# Patient Record
(demographics unavailable — no encounter records)

---

## 2025-05-22 NOTE — PROCEDURE
[FreeTextEntry3] : The patient was offered a steroid injection to suppress acute inflammation.  The indication for the injection is persistent pain. The risks benefits, and alternatives have been discussed, and verbal consent was obtained.   The risks, benefits and contents of the injection have been discussed.  Risks include but are not limited to allergic reaction, flare reaction, permanent white skin discoloration at the injection site and infection.  The patient understands the risks and agrees to having the injection.  All questions have been answered.   An injection of the right 1st MTPJ was performed. The indication for this procedure was pain and inflammation. The site was prepped with alcohol and sterile technique used. With a 25 gauge needle, an injection of 1cc of Lidocaine 1% , 1cc of Ropivacaine  0.5% ,and 1cc of 80mg Methylprednisolone (Depomedrol) was performed. Patient tolerated procedure well. Patient was advised to call if redness, pain, or fever occur, apply ice for 15 minutes out of every hour for the next 12-24 hours as tolerated and patient was advised to rest the joint(s) for 3 days.   Ultrasound guidance was indicated for this patient due to arthritis. All ultrasound images have been permanently captured and stored accordingly in our picture archiving and communication system. Visualization of the needle and placement of injection was performed without complication.

## 2025-05-22 NOTE — PHYSICAL EXAM
[1st] : 1st [NL 30)] : inversion 30 degrees [NL (40)] : MTP joint DF 40 degrees [NL (20)] : MTP joint PF 20 degrees [5___] : eversion 5[unfilled]/5 [2+] : posterior tibialis pulse: 2+ [Normal] : saphenous nerve sensation normal [] : patient ambulates without assistive device [Right] : right foot [Weight -] : weightbearing [de-identified] :  AP, lateral and oblique xray views were taken and reviewed today. Mild hallux valgus, plantar heel spur. Mild narrowing 1st MTPJ.

## 2025-05-22 NOTE — DISCUSSION/SUMMARY
[de-identified] : Discussed treatment options with patient, both operative and non-operative. NSAIDS (topical vs. oral), shoe modifications, activity modification, orthotics with Lancaster's extension, steroid injection and cheilectomy discussed as options. Recommend stiff soled shoe. Ice to affected area is recommended.  The patient was prescribed meloxicam 15mg daily as needed for pain.  They were explained the potential risks of GI pain/bleeding as well as hypertension.  They were told not to take any other nsaids while taking this medication.